# Patient Record
Sex: MALE | Race: BLACK OR AFRICAN AMERICAN | URBAN - METROPOLITAN AREA
[De-identification: names, ages, dates, MRNs, and addresses within clinical notes are randomized per-mention and may not be internally consistent; named-entity substitution may affect disease eponyms.]

---

## 2020-07-07 ENCOUNTER — OFFICE VISIT (OUTPATIENT)
Dept: URBAN - METROPOLITAN AREA CLINIC 40 | Facility: CLINIC | Age: 20
End: 2020-07-07
Payer: COMMERCIAL

## 2020-07-07 ENCOUNTER — LAB OUTSIDE AN ENCOUNTER (OUTPATIENT)
Dept: URBAN - METROPOLITAN AREA CLINIC 40 | Facility: CLINIC | Age: 20
End: 2020-07-07

## 2020-07-07 ENCOUNTER — DASHBOARD ENCOUNTERS (OUTPATIENT)
Age: 20
End: 2020-07-07

## 2020-07-07 ENCOUNTER — OFFICE VISIT (OUTPATIENT)
Dept: URBAN - METROPOLITAN AREA CLINIC 40 | Facility: CLINIC | Age: 20
End: 2020-07-07

## 2020-07-07 DIAGNOSIS — B17.10 HEPATITIS C: ICD-10-CM

## 2020-07-07 DIAGNOSIS — R53.83 FATIGUE: ICD-10-CM

## 2020-07-07 PROCEDURE — 99203 OFFICE O/P NEW LOW 30 MIN: CPT | Performed by: INTERNAL MEDICINE

## 2020-07-07 PROCEDURE — G9903 PT SCRN TBCO ID AS NON USER: HCPCS | Performed by: INTERNAL MEDICINE

## 2020-07-07 PROCEDURE — 1036F TOBACCO NON-USER: CPT | Performed by: INTERNAL MEDICINE

## 2020-07-07 PROCEDURE — G8427 DOCREV CUR MEDS BY ELIG CLIN: HCPCS | Performed by: INTERNAL MEDICINE

## 2020-07-07 PROCEDURE — G8420 CALC BMI NORM PARAMETERS: HCPCS | Performed by: INTERNAL MEDICINE

## 2020-07-07 NOTE — HPI-TODAY'S VISIT:
Patient is a 20-year-old black male referred by [facility].  He recently was seen for an accident and asked for a full panel of blood work to be drawn.  Apparently hepatitis C antibodies have come back positive.  He has no known risk factors for hepatitis C.  He denies any IV drug abuse, blood transfusion, close contacts with known hepatitis C or tattoos.  Clinically he is healthy but has had some issues with fatigue as well as some discomfort in his epigastric area.  His other complaint is change in his urination.  He states that the urine appears to be  when it goes into the toilet bowl.  He states at the urgent care they did check his urine it was negative for infection.  There has been no hematuria.  He denies any abdominal pain, nausea or heartburn.

## 2020-07-12 LAB
A/G RATIO: 2
ALBUMIN: 4.9
ALKALINE PHOSPHATASE: 89
ALT (SGPT): 22
AST (SGOT): 16
BASO (ABSOLUTE): 0
BASOS: 0
BILIRUBIN, TOTAL: 0.3
BUN/CREATININE RATIO: 12
BUN: 12
CALCIUM: 9.6
CARBON DIOXIDE, TOTAL: 23
CHLORIDE: 101
CREATININE: 1
EGFR IF AFRICN AM: 124
EGFR IF NONAFRICN AM: 107
EOS (ABSOLUTE): 0.2
EOS: 7
FINAL INTERPRETATION: NEGATIVE
GLOBULIN, TOTAL: 2.4
GLUCOSE: 76
HBSAG SCREEN: NEGATIVE
HCV LOG10: (no result)
HEMATOCRIT: 50.5
HEMATOLOGY COMMENTS:: (no result)
HEMOGLOBIN: 16.2
HEP A AB, TOTAL: POSITIVE
HEP B CORE AB, TOT: NEGATIVE
HEPATITIS B SURF AB QUANT: 6.7
HEPATITIS C GENOTYPE: (no result)
HEPATITIS C QUANTITATION: (no result)
HIV 1 AB: NEGATIVE
HIV 2 AB: NEGATIVE
IMMATURE CELLS: (no result)
IMMATURE GRANS (ABS): 0
IMMATURE GRANULOCYTES: 0
LYMPHS (ABSOLUTE): 1
LYMPHS: 32
Lab: (no result)
MCH: 28.5
MCHC: 32.1
MCV: 89
MONOCYTES(ABSOLUTE): 0.6
MONOCYTES: 19
NEUTROPHILS (ABSOLUTE): 1.3
NEUTROPHILS: 42
NRBC: (no result)
PLATELETS: 181
POTASSIUM: 4.3
PROTEIN, TOTAL: 7.3
RBC: 5.69
RDW: 12.6
SODIUM: 140
TEST INFORMATION:: (no result)
WBC: 3

## 2020-08-04 ENCOUNTER — OFFICE VISIT (OUTPATIENT)
Dept: URBAN - METROPOLITAN AREA CLINIC 40 | Facility: CLINIC | Age: 20
End: 2020-08-04